# Patient Record
Sex: MALE | Race: OTHER | URBAN - METROPOLITAN AREA
[De-identification: names, ages, dates, MRNs, and addresses within clinical notes are randomized per-mention and may not be internally consistent; named-entity substitution may affect disease eponyms.]

---

## 2017-05-12 ENCOUNTER — OFFICE VISIT (OUTPATIENT)
Dept: FAMILY MEDICINE CLINIC | Facility: CLINIC | Age: 32
End: 2017-05-12

## 2017-05-12 VITALS
BODY MASS INDEX: 26.81 KG/M2 | HEART RATE: 64 BPM | TEMPERATURE: 98 F | HEIGHT: 64.5 IN | SYSTOLIC BLOOD PRESSURE: 127 MMHG | WEIGHT: 159 LBS | DIASTOLIC BLOOD PRESSURE: 79 MMHG

## 2017-05-12 DIAGNOSIS — M54.2 NECK PAIN: ICD-10-CM

## 2017-05-12 DIAGNOSIS — J34.3 HYPERTROPHY OF NASAL TURBINATES: ICD-10-CM

## 2017-05-12 DIAGNOSIS — J02.9 ACUTE PHARYNGITIS, UNSPECIFIED ETIOLOGY: Primary | ICD-10-CM

## 2017-05-12 PROCEDURE — 99203 OFFICE O/P NEW LOW 30 MIN: CPT | Performed by: FAMILY MEDICINE

## 2017-05-12 PROCEDURE — 99212 OFFICE O/P EST SF 10 MIN: CPT | Performed by: FAMILY MEDICINE

## 2017-05-12 RX ORDER — FLUTICASONE PROPIONATE 50 MCG
2 SPRAY, SUSPENSION (ML) NASAL DAILY
Qty: 1 BOTTLE | Refills: 3 | Status: SHIPPED | OUTPATIENT
Start: 2017-05-12 | End: 2017-09-09

## 2017-05-12 RX ORDER — MONTELUKAST SODIUM 10 MG/1
10 TABLET ORAL DAILY
Qty: 30 TABLET | Refills: 3 | Status: SHIPPED | OUTPATIENT
Start: 2017-05-12 | End: 2017-11-08

## 2017-05-12 NOTE — PROGRESS NOTES
Patient ID: Sunny Gonzales is a 28year old male. HPI  Patient presents with:  Sore Throat  Numbness: mouth  Headache    He states he has had a sore throat off and on since December 2016. Initially had a fever at that time.   He states that the f Exam  Blood pressure 127/79, pulse 64, temperature 98 °F (36.7 °C), temperature source Oral, height 5' 4.5\" (1.638 m), weight 159 lb (72.122 kg). Physical Exam   Constitutional: Patient is oriented to person, place, and time.  Patient appears well-develop Propionate 50 MCG/ACT Nasal Suspension; 2 sprays by Nasal route daily.  -     Montelukast Sodium (SINGULAIR) 10 MG Oral Tab; Take 1 tablet (10 mg total) by mouth daily. For his elevated cholesterol I told him to work on diet and watch portion sizes.   I

## 2018-05-12 DIAGNOSIS — J34.3 HYPERTROPHY OF NASAL TURBINATES: ICD-10-CM

## 2018-05-12 DIAGNOSIS — J02.9 ACUTE PHARYNGITIS, UNSPECIFIED ETIOLOGY: ICD-10-CM

## 2018-05-12 DIAGNOSIS — M54.2 NECK PAIN: ICD-10-CM

## 2018-05-15 RX ORDER — MONTELUKAST SODIUM 10 MG/1
10 TABLET ORAL DAILY
Qty: 90 TABLET | Refills: 0 | Status: CANCELLED | OUTPATIENT
Start: 2018-05-15 | End: 2018-11-11

## 2018-08-08 ENCOUNTER — OFFICE VISIT (OUTPATIENT)
Dept: GASTROENTEROLOGY | Facility: CLINIC | Age: 33
End: 2018-08-08
Payer: COMMERCIAL

## 2018-08-08 ENCOUNTER — TELEPHONE (OUTPATIENT)
Dept: GASTROENTEROLOGY | Facility: CLINIC | Age: 33
End: 2018-08-08

## 2018-08-08 VITALS
BODY MASS INDEX: 24.99 KG/M2 | DIASTOLIC BLOOD PRESSURE: 74 MMHG | WEIGHT: 150 LBS | HEART RATE: 73 BPM | SYSTOLIC BLOOD PRESSURE: 117 MMHG | HEIGHT: 65 IN

## 2018-08-08 DIAGNOSIS — K90.41 GLUTEN INTOLERANCE: Primary | ICD-10-CM

## 2018-08-08 DIAGNOSIS — R19.8 RECTAL SYMPTOM: ICD-10-CM

## 2018-08-08 PROCEDURE — 99203 OFFICE O/P NEW LOW 30 MIN: CPT | Performed by: INTERNAL MEDICINE

## 2018-08-08 PROCEDURE — 99212 OFFICE O/P EST SF 10 MIN: CPT | Performed by: INTERNAL MEDICINE

## 2018-08-08 NOTE — TELEPHONE ENCOUNTER
Sheri/[facility] registration calling for mutual pt that is at the labs, pt was seen today and was advised that labs orders would be placed, will attempt to transfer, if not pls call at:[phone],thanks.

## 2018-08-08 NOTE — TELEPHONE ENCOUNTER
I spoke to Vandana--informed that Dr with pt and has not even had time to dictate. I placed reminder note on his desk when he finishes with pt.

## 2018-08-09 NOTE — PATIENT INSTRUCTIONS
1. Maintain high-fiber intake. 2.  Avoid prolonged sitting at the stool or straining at the stool. 3.  Blood work for celiac disease. 4.  Further recommendations pending the above results.

## 2018-08-09 NOTE — PROGRESS NOTES
HPI:    Patient ID: Terri Roland is a 29year old male. HPI  The patient is self referred. Several years prior he was evaluated in [place] for bright red blood on the toilet tissue and oily discharge on the tissue and in the toilet bowl.   He a moist. No oropharyngeal exudate. Eyes: Conjunctivae are normal. No scleral icterus. Neck: Neck supple. No thyromegaly present. Cardiovascular: Normal rate, regular rhythm and normal heart sounds. No murmur heard.   Pulmonary/Chest: Effort normal an Transglutaminase Ab, IgA      Immunoglobulin A, Qn, Serum (IGA)    Meds This Visit:  No prescriptions requested or ordered in this encounter    Imaging & Referrals:  None       OU#[id number]

## 2018-10-02 ENCOUNTER — TELEPHONE (OUTPATIENT)
Dept: GASTROENTEROLOGY | Facility: CLINIC | Age: 33
End: 2018-10-02

## 2018-10-02 NOTE — TELEPHONE ENCOUNTER
Overdue labs letter mailed out to pt today.     Labs: immunoglobulin A, QN, Serum & Tissue Transglutaminase AB IGA

## 2019-12-30 ENCOUNTER — OFFICE VISIT (OUTPATIENT)
Dept: SURGERY | Facility: CLINIC | Age: 34
End: 2019-12-30

## 2019-12-30 VITALS
SYSTOLIC BLOOD PRESSURE: 112 MMHG | BODY MASS INDEX: 25.52 KG/M2 | RESPIRATION RATE: 16 BRPM | HEIGHT: 64 IN | HEART RATE: 73 BPM | WEIGHT: 149.5 LBS | TEMPERATURE: 98 F | DIASTOLIC BLOOD PRESSURE: 76 MMHG

## 2019-12-30 DIAGNOSIS — K64.2 GRADE III INTERNAL HEMORRHOIDS: Primary | ICD-10-CM

## 2019-12-30 PROCEDURE — 99243 OFF/OP CNSLTJ NEW/EST LOW 30: CPT | Performed by: COLON & RECTAL SURGERY

## 2019-12-30 PROCEDURE — 46600 DIAGNOSTIC ANOSCOPY SPX: CPT | Performed by: COLON & RECTAL SURGERY

## 2019-12-30 NOTE — H&P
New Patient Visit Note       Active Problems      1.  Grade III internal hemorrhoids        Chief Complaint   Patient presents with:  New Patient: Anal pain, anal bleeding      History of Present Illness   Jose Michelle is a 36year old male who pres Date   • WISDOM TEETH REMOVED  2008       Family History   Problem Relation Age of Onset   • Other (hypotension) Mother      Social History    Tobacco Use      Smoking status: Former Smoker        Types: Cigarettes        Start date: 12/30/2005        Quit well-nourished. No distress. HENT:   Head: Normocephalic and atraumatic. Nose: Nose normal.   Eyes: Pupils are equal, round, and reactive to light. EOM are normal. No scleral icterus. Neck: Normal range of motion.    Cardiovascular: Normal rate and re Patient wishes to proceed with excisional hemorrhoidectomy.     The risks of hemorrhoidectomy were discussed with the patient and include but are not limited to severe or chronic pain, hemorrhage, infection or sepsis, incision dehiscence,  incontinence, faustino

## 2019-12-31 NOTE — PATIENT INSTRUCTIONS
Assessment   Grade III internal hemorrhoids  (primary encounter diagnosis)      Plan   Patient has grade 3 internal hemorrhoids. There is no active bleeding. There is no fissure or fistula. There is minimal enlargement of the external hemorrhoid tissue.

## 2020-01-06 ENCOUNTER — TELEPHONE (OUTPATIENT)
Dept: SURGERY | Facility: CLINIC | Age: 35
End: 2020-01-06

## 2020-01-06 DIAGNOSIS — K64.2 GRADE III HEMORRHOIDS: Primary | ICD-10-CM
